# Patient Record
Sex: MALE | Race: WHITE | NOT HISPANIC OR LATINO | URBAN - NONMETROPOLITAN AREA
[De-identification: names, ages, dates, MRNs, and addresses within clinical notes are randomized per-mention and may not be internally consistent; named-entity substitution may affect disease eponyms.]

---

## 2019-09-24 ENCOUNTER — IMPORTED ENCOUNTER (OUTPATIENT)
Dept: URBAN - NONMETROPOLITAN AREA CLINIC 1 | Facility: CLINIC | Age: 69
End: 2019-09-24

## 2019-09-24 PROBLEM — H52.4: Noted: 2019-09-24

## 2019-09-24 PROBLEM — H52.03: Noted: 2019-09-24

## 2019-09-24 PROBLEM — H02.834: Noted: 2019-09-24

## 2019-09-24 PROBLEM — H10.44: Noted: 2019-09-24

## 2019-09-24 PROBLEM — H02.831: Noted: 2019-09-24

## 2019-09-24 PROBLEM — H43.813: Noted: 2019-09-24

## 2019-09-24 PROCEDURE — 92014 COMPRE OPH EXAM EST PT 1/>: CPT

## 2019-09-24 PROCEDURE — 92015 DETERMINE REFRACTIVE STATE: CPT

## 2019-09-24 NOTE — PATIENT DISCUSSION
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 Uses Pataday qd for itchy eyes prn. BF rx given. Minimal cataracts OU; 's Notes: [place] [employer]. Has restarted fishing especially when his [de-identified] old grandson comes to visit and enjoys it. Helping wife recover from back sx.

## 2020-09-30 ENCOUNTER — IMPORTED ENCOUNTER (OUTPATIENT)
Dept: URBAN - NONMETROPOLITAN AREA CLINIC 1 | Facility: CLINIC | Age: 70
End: 2020-09-30

## 2020-09-30 PROBLEM — H52.4: Noted: 2020-09-30

## 2020-09-30 PROBLEM — H02.831: Noted: 2020-09-30

## 2020-09-30 PROBLEM — H02.834: Noted: 2020-09-30

## 2020-09-30 PROBLEM — H43.813: Noted: 2020-09-30

## 2020-09-30 PROBLEM — H52.03: Noted: 2020-09-30

## 2020-09-30 PROCEDURE — 92014 COMPRE OPH EXAM EST PT 1/>: CPT

## 2020-09-30 NOTE — PATIENT DISCUSSION
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 dermatochalasis oumonitorPVD ou-Retina flat 360 with no breaks tears or heme.-S&S of RD/RT reviewed with pt.-Stressed that pt should contact our office right away with any changes or increase in symptoms.; 's Notes: [place] [employer]. Has restarted fishing especially when his [de-identified] old grandson comes to visit and enjoys it. Helping wife recover from back sx.

## 2022-04-10 ASSESSMENT — TONOMETRY
OD_IOP_MMHG: 17
OS_IOP_MMHG: 17
OS_IOP_MMHG: 22
OD_IOP_MMHG: 22

## 2022-04-10 ASSESSMENT — VISUAL ACUITY
OD_SC: 20/25
OS_SC: 20/25
OD_SC: 20/40+
OS_SC: 20/25
OU_CC: J1

## 2022-12-12 ENCOUNTER — COMPREHENSIVE EXAM (OUTPATIENT)
Dept: RURAL CLINIC 1 | Facility: CLINIC | Age: 72
End: 2022-12-12

## 2022-12-12 PROCEDURE — 92015 DETERMINE REFRACTIVE STATE: CPT

## 2022-12-12 PROCEDURE — 92014 COMPRE OPH EXAM EST PT 1/>: CPT

## 2022-12-12 ASSESSMENT — TONOMETRY
OS_IOP_MMHG: 16
OD_IOP_MMHG: 17

## 2022-12-12 ASSESSMENT — VISUAL ACUITY
OD_BAT: 20/50-2
OS_SC: 20/30-2
OD_SC: 20/30
OS_BAT: 20/40

## 2022-12-12 NOTE — PATIENT DISCUSSION
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 dermatochalasis oumonitorPVD ou-Retina flat 360 with no breaks tears or heme.-S&S of RD/RT reviewed with pt.-Stressed that pt should contact our office right away with any changes or increase in symptoms.; 's Notes: [place] [employer]. Has restarted fishing especially when his [de-identified] old grandson comes to visit and enjoys it. Helping wife recover from back sx.

## 2024-04-02 ENCOUNTER — ESTABLISHED PATIENT (OUTPATIENT)
Dept: RURAL CLINIC 1 | Facility: CLINIC | Age: 74
End: 2024-04-02

## 2024-04-02 DIAGNOSIS — H02.831: ICD-10-CM

## 2024-04-02 DIAGNOSIS — H25.13: ICD-10-CM

## 2024-04-02 DIAGNOSIS — H02.834: ICD-10-CM

## 2024-04-02 DIAGNOSIS — H43.813: ICD-10-CM

## 2024-04-02 PROCEDURE — 92014 COMPRE OPH EXAM EST PT 1/>: CPT

## 2024-04-02 ASSESSMENT — VISUAL ACUITY
OS_CC: 20/50-2
OD_CC: 20/40-1

## 2024-04-02 ASSESSMENT — TONOMETRY
OS_IOP_MMHG: 16
OD_IOP_MMHG: 16

## 2025-04-07 ENCOUNTER — COMPREHENSIVE EXAM (OUTPATIENT)
Age: 75
End: 2025-04-07

## 2025-04-07 DIAGNOSIS — H43.813: ICD-10-CM

## 2025-04-07 DIAGNOSIS — H02.831: ICD-10-CM

## 2025-04-07 DIAGNOSIS — H02.834: ICD-10-CM

## 2025-04-07 DIAGNOSIS — H25.13: ICD-10-CM

## 2025-04-07 PROCEDURE — 92014 COMPRE OPH EXAM EST PT 1/>: CPT
